# Patient Record
Sex: FEMALE | Race: OTHER | NOT HISPANIC OR LATINO | URBAN - METROPOLITAN AREA
[De-identification: names, ages, dates, MRNs, and addresses within clinical notes are randomized per-mention and may not be internally consistent; named-entity substitution may affect disease eponyms.]

---

## 2024-01-03 ENCOUNTER — EMERGENCY (EMERGENCY)
Facility: HOSPITAL | Age: 79
LOS: 1 days | Discharge: HOME | End: 2024-01-03
Attending: EMERGENCY MEDICINE | Admitting: EMERGENCY MEDICINE
Payer: MEDICARE

## 2024-01-03 VITALS
TEMPERATURE: 99 F | HEART RATE: 85 BPM | RESPIRATION RATE: 18 BRPM | OXYGEN SATURATION: 98 % | DIASTOLIC BLOOD PRESSURE: 58 MMHG | SYSTOLIC BLOOD PRESSURE: 116 MMHG

## 2024-01-03 VITALS
TEMPERATURE: 98 F | RESPIRATION RATE: 16 BRPM | HEIGHT: 64 IN | WEIGHT: 130.07 LBS | DIASTOLIC BLOOD PRESSURE: 84 MMHG | SYSTOLIC BLOOD PRESSURE: 155 MMHG | OXYGEN SATURATION: 97 % | HEART RATE: 65 BPM

## 2024-01-03 DIAGNOSIS — Z23 ENCOUNTER FOR IMMUNIZATION: ICD-10-CM

## 2024-01-03 DIAGNOSIS — S01.81XA LACERATION WITHOUT FOREIGN BODY OF OTHER PART OF HEAD, INITIAL ENCOUNTER: ICD-10-CM
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
## 2024-01-03 DIAGNOSIS — I48.91 UNSPECIFIED ATRIAL FIBRILLATION: ICD-10-CM

## 2024-01-03 DIAGNOSIS — Z87.820 PERSONAL HISTORY OF TRAUMATIC BRAIN INJURY: ICD-10-CM

## 2024-01-03 DIAGNOSIS — S09.90XA UNSPECIFIED INJURY OF HEAD, INITIAL ENCOUNTER: ICD-10-CM

## 2024-01-03 DIAGNOSIS — I45.10 UNSPECIFIED RIGHT BUNDLE-BRANCH BLOCK: ICD-10-CM
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
## 2024-01-03 DIAGNOSIS — G93.89 OTHER SPECIFIED DISORDERS OF BRAIN: ICD-10-CM

## 2024-01-03 DIAGNOSIS — Z79.01 LONG TERM (CURRENT) USE OF ANTICOAGULANTS: ICD-10-CM

## 2024-01-03 LAB
ALBUMIN SERPL ELPH-MCNC: 4.1 G/DL — SIGNIFICANT CHANGE UP (ref 3.3–5)
ALBUMIN SERPL ELPH-MCNC: 4.1 G/DL — SIGNIFICANT CHANGE UP (ref 3.3–5)
ALP SERPL-CCNC: 90 U/L — SIGNIFICANT CHANGE UP (ref 40–120)
ALP SERPL-CCNC: 90 U/L — SIGNIFICANT CHANGE UP (ref 40–120)
ALT FLD-CCNC: 14 U/L — SIGNIFICANT CHANGE UP (ref 10–45)
ALT FLD-CCNC: 14 U/L — SIGNIFICANT CHANGE UP (ref 10–45)
ANION GAP SERPL CALC-SCNC: 10 MMOL/L — SIGNIFICANT CHANGE UP (ref 5–17)
ANION GAP SERPL CALC-SCNC: 10 MMOL/L — SIGNIFICANT CHANGE UP (ref 5–17)
APTT BLD: 32 SEC — SIGNIFICANT CHANGE UP (ref 24.5–35.6)
APTT BLD: 32 SEC — SIGNIFICANT CHANGE UP (ref 24.5–35.6)
AST SERPL-CCNC: 28 U/L — SIGNIFICANT CHANGE UP (ref 10–40)
AST SERPL-CCNC: 28 U/L — SIGNIFICANT CHANGE UP (ref 10–40)
BASOPHILS # BLD AUTO: 0.01 K/UL — SIGNIFICANT CHANGE UP (ref 0–0.2)
BASOPHILS # BLD AUTO: 0.01 K/UL — SIGNIFICANT CHANGE UP (ref 0–0.2)
BASOPHILS NFR BLD AUTO: 0.2 % — SIGNIFICANT CHANGE UP (ref 0–2)
BASOPHILS NFR BLD AUTO: 0.2 % — SIGNIFICANT CHANGE UP (ref 0–2)
BILIRUB SERPL-MCNC: 0.6 MG/DL — SIGNIFICANT CHANGE UP (ref 0.2–1.2)
BILIRUB SERPL-MCNC: 0.6 MG/DL — SIGNIFICANT CHANGE UP (ref 0.2–1.2)
BLD GP AB SCN SERPL QL: NEGATIVE — SIGNIFICANT CHANGE UP
BLD GP AB SCN SERPL QL: NEGATIVE — SIGNIFICANT CHANGE UP
BUN SERPL-MCNC: 15 MG/DL — SIGNIFICANT CHANGE UP (ref 7–23)
BUN SERPL-MCNC: 15 MG/DL — SIGNIFICANT CHANGE UP (ref 7–23)
CALCIUM SERPL-MCNC: 9.6 MG/DL — SIGNIFICANT CHANGE UP (ref 8.4–10.5)
CALCIUM SERPL-MCNC: 9.6 MG/DL — SIGNIFICANT CHANGE UP (ref 8.4–10.5)
CHLORIDE SERPL-SCNC: 103 MMOL/L — SIGNIFICANT CHANGE UP (ref 96–108)
CHLORIDE SERPL-SCNC: 103 MMOL/L — SIGNIFICANT CHANGE UP (ref 96–108)
CO2 SERPL-SCNC: 25 MMOL/L — SIGNIFICANT CHANGE UP (ref 22–31)
CO2 SERPL-SCNC: 25 MMOL/L — SIGNIFICANT CHANGE UP (ref 22–31)
CREAT SERPL-MCNC: 0.89 MG/DL — SIGNIFICANT CHANGE UP (ref 0.5–1.3)
CREAT SERPL-MCNC: 0.89 MG/DL — SIGNIFICANT CHANGE UP (ref 0.5–1.3)
EGFR: 66 ML/MIN/1.73M2 — SIGNIFICANT CHANGE UP
EGFR: 66 ML/MIN/1.73M2 — SIGNIFICANT CHANGE UP
EOSINOPHIL # BLD AUTO: 0.01 K/UL — SIGNIFICANT CHANGE UP (ref 0–0.5)
EOSINOPHIL # BLD AUTO: 0.01 K/UL — SIGNIFICANT CHANGE UP (ref 0–0.5)
EOSINOPHIL NFR BLD AUTO: 0.2 % — SIGNIFICANT CHANGE UP (ref 0–6)
EOSINOPHIL NFR BLD AUTO: 0.2 % — SIGNIFICANT CHANGE UP (ref 0–6)
GLUCOSE SERPL-MCNC: 89 MG/DL — SIGNIFICANT CHANGE UP (ref 70–99)
GLUCOSE SERPL-MCNC: 89 MG/DL — SIGNIFICANT CHANGE UP (ref 70–99)
HCT VFR BLD CALC: 36.8 % — SIGNIFICANT CHANGE UP (ref 34.5–45)
HCT VFR BLD CALC: 36.8 % — SIGNIFICANT CHANGE UP (ref 34.5–45)
HGB BLD-MCNC: 12.3 G/DL — SIGNIFICANT CHANGE UP (ref 11.5–15.5)
HGB BLD-MCNC: 12.3 G/DL — SIGNIFICANT CHANGE UP (ref 11.5–15.5)
IMM GRANULOCYTES NFR BLD AUTO: 0.2 % — SIGNIFICANT CHANGE UP (ref 0–0.9)
IMM GRANULOCYTES NFR BLD AUTO: 0.2 % — SIGNIFICANT CHANGE UP (ref 0–0.9)
INR BLD: 1.16 — SIGNIFICANT CHANGE UP (ref 0.85–1.18)
INR BLD: 1.16 — SIGNIFICANT CHANGE UP (ref 0.85–1.18)
LYMPHOCYTES # BLD AUTO: 1.29 K/UL — SIGNIFICANT CHANGE UP (ref 1–3.3)
LYMPHOCYTES # BLD AUTO: 1.29 K/UL — SIGNIFICANT CHANGE UP (ref 1–3.3)
LYMPHOCYTES # BLD AUTO: 25.9 % — SIGNIFICANT CHANGE UP (ref 13–44)
LYMPHOCYTES # BLD AUTO: 25.9 % — SIGNIFICANT CHANGE UP (ref 13–44)
MCHC RBC-ENTMCNC: 31.8 PG — SIGNIFICANT CHANGE UP (ref 27–34)
MCHC RBC-ENTMCNC: 31.8 PG — SIGNIFICANT CHANGE UP (ref 27–34)
MCHC RBC-ENTMCNC: 33.4 GM/DL — SIGNIFICANT CHANGE UP (ref 32–36)
MCHC RBC-ENTMCNC: 33.4 GM/DL — SIGNIFICANT CHANGE UP (ref 32–36)
MCV RBC AUTO: 95.1 FL — SIGNIFICANT CHANGE UP (ref 80–100)
MCV RBC AUTO: 95.1 FL — SIGNIFICANT CHANGE UP (ref 80–100)
MONOCYTES # BLD AUTO: 0.58 K/UL — SIGNIFICANT CHANGE UP (ref 0–0.9)
MONOCYTES # BLD AUTO: 0.58 K/UL — SIGNIFICANT CHANGE UP (ref 0–0.9)
MONOCYTES NFR BLD AUTO: 11.6 % — SIGNIFICANT CHANGE UP (ref 2–14)
MONOCYTES NFR BLD AUTO: 11.6 % — SIGNIFICANT CHANGE UP (ref 2–14)
NEUTROPHILS # BLD AUTO: 3.09 K/UL — SIGNIFICANT CHANGE UP (ref 1.8–7.4)
NEUTROPHILS # BLD AUTO: 3.09 K/UL — SIGNIFICANT CHANGE UP (ref 1.8–7.4)
NEUTROPHILS NFR BLD AUTO: 61.9 % — SIGNIFICANT CHANGE UP (ref 43–77)
NEUTROPHILS NFR BLD AUTO: 61.9 % — SIGNIFICANT CHANGE UP (ref 43–77)
NRBC # BLD: 0 /100 WBCS — SIGNIFICANT CHANGE UP (ref 0–0)
NRBC # BLD: 0 /100 WBCS — SIGNIFICANT CHANGE UP (ref 0–0)
PLATELET # BLD AUTO: 150 K/UL — SIGNIFICANT CHANGE UP (ref 150–400)
PLATELET # BLD AUTO: 150 K/UL — SIGNIFICANT CHANGE UP (ref 150–400)
POTASSIUM SERPL-MCNC: 4.4 MMOL/L — SIGNIFICANT CHANGE UP (ref 3.5–5.3)
POTASSIUM SERPL-MCNC: 4.4 MMOL/L — SIGNIFICANT CHANGE UP (ref 3.5–5.3)
POTASSIUM SERPL-SCNC: 4.4 MMOL/L — SIGNIFICANT CHANGE UP (ref 3.5–5.3)
POTASSIUM SERPL-SCNC: 4.4 MMOL/L — SIGNIFICANT CHANGE UP (ref 3.5–5.3)
PROT SERPL-MCNC: 6.9 G/DL — SIGNIFICANT CHANGE UP (ref 6–8.3)
PROT SERPL-MCNC: 6.9 G/DL — SIGNIFICANT CHANGE UP (ref 6–8.3)
PROTHROM AB SERPL-ACNC: 13.2 SEC — HIGH (ref 9.5–13)
PROTHROM AB SERPL-ACNC: 13.2 SEC — HIGH (ref 9.5–13)
RBC # BLD: 3.87 M/UL — SIGNIFICANT CHANGE UP (ref 3.8–5.2)
RBC # BLD: 3.87 M/UL — SIGNIFICANT CHANGE UP (ref 3.8–5.2)
RBC # FLD: 13.2 % — SIGNIFICANT CHANGE UP (ref 10.3–14.5)
RBC # FLD: 13.2 % — SIGNIFICANT CHANGE UP (ref 10.3–14.5)
RH IG SCN BLD-IMP: POSITIVE — SIGNIFICANT CHANGE UP
RH IG SCN BLD-IMP: POSITIVE — SIGNIFICANT CHANGE UP
SODIUM SERPL-SCNC: 138 MMOL/L — SIGNIFICANT CHANGE UP (ref 135–145)
SODIUM SERPL-SCNC: 138 MMOL/L — SIGNIFICANT CHANGE UP (ref 135–145)
WBC # BLD: 4.99 K/UL — SIGNIFICANT CHANGE UP (ref 3.8–10.5)
WBC # BLD: 4.99 K/UL — SIGNIFICANT CHANGE UP (ref 3.8–10.5)
WBC # FLD AUTO: 4.99 K/UL — SIGNIFICANT CHANGE UP (ref 3.8–10.5)
WBC # FLD AUTO: 4.99 K/UL — SIGNIFICANT CHANGE UP (ref 3.8–10.5)

## 2024-01-03 PROCEDURE — 93005 ELECTROCARDIOGRAM TRACING: CPT

## 2024-01-03 PROCEDURE — 70450 CT HEAD/BRAIN W/O DYE: CPT | Mod: 26,MA

## 2024-01-03 PROCEDURE — 90715 TDAP VACCINE 7 YRS/> IM: CPT

## 2024-01-03 PROCEDURE — 70450 CT HEAD/BRAIN W/O DYE: CPT | Mod: MA

## 2024-01-03 PROCEDURE — 86850 RBC ANTIBODY SCREEN: CPT

## 2024-01-03 PROCEDURE — 99285 EMERGENCY DEPT VISIT HI MDM: CPT | Mod: 25

## 2024-01-03 PROCEDURE — 86901 BLOOD TYPING SEROLOGIC RH(D): CPT

## 2024-01-03 PROCEDURE — 93010 ELECTROCARDIOGRAM REPORT: CPT

## 2024-01-03 PROCEDURE — 90471 IMMUNIZATION ADMIN: CPT

## 2024-01-03 PROCEDURE — 36415 COLL VENOUS BLD VENIPUNCTURE: CPT

## 2024-01-03 PROCEDURE — 80053 COMPREHEN METABOLIC PANEL: CPT

## 2024-01-03 PROCEDURE — 12051 INTMD RPR FACE/MM 2.5 CM/<: CPT

## 2024-01-03 PROCEDURE — 85025 COMPLETE CBC W/AUTO DIFF WBC: CPT

## 2024-01-03 PROCEDURE — 99285 EMERGENCY DEPT VISIT HI MDM: CPT

## 2024-01-03 PROCEDURE — 85730 THROMBOPLASTIN TIME PARTIAL: CPT

## 2024-01-03 PROCEDURE — 86900 BLOOD TYPING SEROLOGIC ABO: CPT

## 2024-01-03 PROCEDURE — 85610 PROTHROMBIN TIME: CPT

## 2024-01-03 PROCEDURE — 70450 CT HEAD/BRAIN W/O DYE: CPT | Mod: 26,MD,77

## 2024-01-03 RX ORDER — TETANUS TOXOID, REDUCED DIPHTHERIA TOXOID AND ACELLULAR PERTUSSIS VACCINE, ADSORBED 5; 2.5; 8; 8; 2.5 [IU]/.5ML; [IU]/.5ML; UG/.5ML; UG/.5ML; UG/.5ML
0.5 SUSPENSION INTRAMUSCULAR ONCE
Refills: 0 | Status: COMPLETED | OUTPATIENT
Start: 2024-01-03 | End: 2024-01-03

## 2024-01-03 RX ORDER — ACETAMINOPHEN 500 MG
650 TABLET ORAL ONCE
Refills: 0 | Status: COMPLETED | OUTPATIENT
Start: 2024-01-03 | End: 2024-01-03

## 2024-01-03 RX ADMIN — Medication 650 MILLIGRAM(S): at 13:27

## 2024-01-03 RX ADMIN — TETANUS TOXOID, REDUCED DIPHTHERIA TOXOID AND ACELLULAR PERTUSSIS VACCINE, ADSORBED 0.5 MILLILITER(S): 5; 2.5; 8; 8; 2.5 SUSPENSION INTRAMUSCULAR at 13:28

## 2024-01-03 NOTE — ED PROVIDER NOTE - PATIENT PORTAL LINK FT
You can access the FollowMyHealth Patient Portal offered by [facility] by registering at the following website: [email or web address]. By joining [employer]’s FollowMyHealth portal, you will also be able to view your health information using other applications (apps) compatible with our system. You can access the FollowMyHealth Patient Portal offered by [facility] by registering at the following website: [email or web address]. By joining [employer]’s FollowMyHealth portal, you will also be able to view your health information using other applications (apps) compatible with our system. You can access the FollowMyHealth Patient Portal offered by [facility] by registering at the following website: [email or web address]. By joining [employer]’s FollowMyHealth portal, you will also be able to view your health information using other applications (apps) compatible with our system. You can access the FollowMyHealth Patient Portal offered by [facility] by registering at the following website: [email or web address]. By joining [employer]’s FollowMyHealth portal, you will also be able to view your health information using other applications (apps) compatible with our system.

## 2024-01-03 NOTE — CONSULT NOTE ADULT - ASSESSMENT
S/p trip and fall today, no LOC, right frontal laceration.    PMH: a fib, on Eliquis  PSH: s/p craniectomy for trauma, endovascular cardiac ablation, pacemaker.   NKDA    PE:   R frontal supraorbital 2 cm oblique irregular open wound through the muscle possibly involving frontalis and , medially undermined skin flap. No reported scalp sensory deficits.   CT possible small left fronto parietal hemorrhage.     Procedure;   Local Lidocaine 1%+1:100,00 Epi  2ml, washout, closure muscle and deep dermis - 4-0 Vicryl, Skin 6-0 Prolene.   Steri strips applied.    A/P  1. Risks including but not limited to bleeding, infection, scarring, asymmetry, sensation changes discussed.   2. Keep area clean  3. F/u 1 week  4. Observation and management per ED for possible intracranial hemorrhage.

## 2024-01-03 NOTE — ED PROVIDER NOTE - SKIN, MLM
2 cm linear vertical laceration noted to mid forehead with some bleeding, Skin otherwise normal color for race, warm, dry and intact. No evidence of rash.

## 2024-01-03 NOTE — ED ADULT NURSE NOTE - OBJECTIVE STATEMENT
Pt is a 79yo female PMHx Afib, pacemaker presents to ED c/o fall. Pt states, "I was walking in a dark hallway when I tripped over a metal bench and landed on my face". Pt noted to have a deep laceration to forehead dressed by EMS. Pt is on Eliquis, c/o mild headache where she is cut. Pt is A&Ox4, breathing equal and unlabored, denies c/p, sob, f/c.

## 2024-01-03 NOTE — ED ADULT TRIAGE NOTE - CHIEF COMPLAINT QUOTE
Pt presents to ED C/O mechanical fall over sharp metal bench at about 10AM today. C/O HA with bleeding lac to forehead. Pt neuro intact. Ambulating well independently. Pt denies dizziness/ CP prior to fall. On Eliquis.

## 2024-01-03 NOTE — ED PROVIDER NOTE - CARE PROVIDER_API CALL
Lucas Rodriguez  Plastic Surgery  [address] [place] [address]  Phone: [phone]  Fax: [phone]  Follow Up Time:    Lucas Rodriguez  Plastic Surgery  [address] [place] [address]  Phone: [phone]  Fax: [phone]  Follow Up Time:    Lucas Rodriguez  Plastic Surgery  [address] [place] [address]  Phone: [phone]  Fax: [phone]  Follow Up Time:     Andie Gonzalez  Neurosurgery  [address] [place] [address]  Phone: [phone]  Fax: [phone]  Follow Up Time:    Lucas Rodriguez  Plastic Surgery  [address] [place] [address]  Phone: [phone]  Fax: [phone]  Follow Up Time:     Andie Gonzalez  Neurosurgery  [address] [place] [address]  Phone: [phone]  Fax: [phone]  Follow Up Time:

## 2024-01-03 NOTE — ED PROVIDER NOTE - PROVIDER TOKENS
PROVIDER:[TOKEN:[[id number]]] PROVIDER:[TOKEN:[[id number]]] PROVIDER:[TOKEN:[[id number]]],PROVIDER:[TOKEN:[[id number]]] PROVIDER:[TOKEN:[[id number]]],PROVIDER:[TOKEN:[[id number]]]

## 2024-01-03 NOTE — ED PROVIDER NOTE - PROGRESS NOTE DETAILS
Ling: repeat head CT unchanged.  d/w neurosurgery, who will come down to ED with recommendations shortly.  pt and  updated. Ling: neurosurg cleared pt for DC.  may continue eliquis.  discussed with pt.  has no complaints, feels well. she will follow up with her PCP Dr Cazares at [facility] tomorrow, and recommend follow up outpt with Dr Gonzalez/neurosurgeon.

## 2024-01-03 NOTE — ED ADULT NURSE NOTE - RESPIRATION RHYTHM, QM
regular Consent (Temporal Branch)/Introductory Paragraph: The rationale for Mohs was explained to the patient and consent was obtained. The risks, benefits and alternatives to therapy were discussed in detail. Specifically, the risks of damage to the temporal branch of the facial nerve, infection, scarring, bleeding, prolonged wound healing, incomplete removal, allergy to anesthesia, and recurrence were addressed. Prior to the procedure, the treatment site was clearly identified and confirmed by the patient. All components of Universal Protocol/PAUSE Rule completed.

## 2024-01-03 NOTE — ED PROVIDER NOTE - CLINICAL SUMMARY MEDICAL DECISION MAKING FREE TEXT BOX
78-year-old female with past medical history of atrial fibrillation [on Eliquis only], denies any other medical history or medications presents today with mechanical trip and fall with laceration to mid forehead that occurred approximately 2 hours prior to ED arrival.  Patient went to an urgent care initially and was advised that she would need a CT head and sutures.  Patient presents to [facility] ED for CT head and is requesting plastic surgery.  Does not recall her last tetanus vaccination.  Is complaining of a mild headache in the area of her laceration.  Denies neck pain, back pain, chest pain, shortness of breath, abdominal pain, joint pain or any other extremity pain. No other complaints.    ED course: Patient afebrile.  Rest of vital signs are within normal limits. On exam, 2 cm laceration noted to mid-forehead. Given Tylenol. Tetanus updated. Plastics consulted and Dr. Rodriguez in ED to suture patient.  Patient to follow-up with him in 1 week. Neurosurgery consulted and would like a repeat CT scan in 4 hours which is scheduled for 1700 hrs.  Patient is advised of this plan.  dispo per CT scan. 78-year-old female with past medical history of atrial fibrillation [on Eliquis only], denies any other medical history or medications presents today with mechanical trip and fall with laceration to mid forehead that occurred approximately 2 hours prior to ED arrival.  Patient went to an urgent care initially and was advised that she would need a CT head and sutures.  Patient presents to [facility] ED for CT head and is requesting plastic surgery.  Does not recall her last tetanus vaccination.  Is complaining of a mild headache in the area of her laceration.  Denies neck pain, back pain, chest pain, shortness of breath, abdominal pain, joint pain or any other extremity pain. No other complaints.    ED course: Patient afebrile.  Rest of vital signs are within normal limits. On exam, 2 cm laceration noted to mid-forehead. Given Tylenol. Tetanus updated. Plastics consulted and Dr. Rodriguez in ED to suture patient.  Patient to follow-up with him in 1 week. Neurosurgery consulted and would like a repeat CT scan in 4 hours which is scheduled for 1700 hrs.  Patient is advised of this plan.  dispo per CT scan. 78-year-old female with past medical history of atrial fibrillation [on Eliquis only], denies any other medical history or medications presents today with mechanical trip and fall with laceration to mid forehead that occurred approximately 2 hours prior to ED arrival.  Patient went to an urgent care initially and was advised that she would need a CT head and sutures.  Patient presents to [facility] ED for CT head and is requesting plastic surgery.  Does not recall her last tetanus vaccination.  Is complaining of a mild headache in the area of her laceration.  Denies neck pain, back pain, chest pain, shortness of breath, abdominal pain, joint pain or any other extremity pain. No other complaints.    ED course:   Patient afebrile.  Rest of vital signs are within normal limits. On exam, pt with 2 cm laceration noted to mid-forehead. Given Tylenol. Tetanus updated. CT done and shows: IMPRESSION:  1. Tiny low density extra-axial fluid collection overlying the left   frontal parietal convexity containing a curvilinear focus of soft tissue   with calcifications that may represent postoperative material, septation   or dural thickening. However there are foci of hyperdensity along the   parietal frontal parietal   and temporal convexity that may represent   calcification or hemorrhage.  2. No calvarial fracture. Frontoparietotemporal craniectomy and   cranioplasty with encephalomalacic changes seen in the left frontal lobe.    Patient was questioned about post-op findings and then admits to having ICH 2+ years ago which required surgery. Neurosurgery consulted and would like a repeat CT scan in 4 hours which is scheduled for 1700 hrs. Plastics consulted and Dr. Rodriguez in ED to suture patient.  Patient to follow-up with him in 1 week. Patient is advised of this plan. Case endorsed to night team. Dispo per repeat CT scan and reevaluation. 78-year-old female with past medical history of atrial fibrillation [on Eliquis only], denies any other medical history or medications presents today with mechanical trip and fall with laceration to mid forehead that occurred approximately 2 hours prior to ED arrival.  Patient went to an urgent care initially and was advised that she would need a CT head and sutures.  Patient presents to [facility] ED for CT head and is requesting plastic surgery.  Does not recall her last tetanus vaccination.  Is complaining of a mild headache in the area of her laceration.  Denies neck pain, back pain, chest pain, shortness of breath, abdominal pain, joint pain or any other extremity pain. No other complaints.    ED course:   Patient afebrile.  Rest of vital signs are within normal limits. On exam, pt with 2 cm laceration noted to mid-forehead. Given Tylenol. Tetanus updated. CT done and shows: IMPRESSION:  1. Tiny low density extra-axial fluid collection overlying the left   frontal parietal convexity containing a curvilinear focus of soft tissue   with calcifications that may represent postoperative material, septation   or dural thickening. However there are foci of hyperdensity along the   parietal frontal parietal   and temporal convexity that may represent   calcification or hemorrhage.  2. No calvarial fracture. Frontoparietotemporal craniectomy and   cranioplasty with encephalomalacic changes seen in the left frontal lobe.    Patient was questioned about post-op findings and then admits to having ICH 2+ years ago which required surgery. Neurosurgery consulted and would like a repeat CT scan in 4 hours which is scheduled for 1700 hrs. Plastics consulted and Dr. Rodriguez in ED to suture patient.  Patient to follow-up with him in 1 week. Patient is advised of this plan. Case endorsed to night team. Dispo per repeat CT scan and reevaluation. 78-year-old female with past medical history of atrial fibrillation [on Eliquis only], denies any other medical history or medications presents today with mechanical trip and fall with laceration to mid forehead that occurred approximately 2 hours prior to ED arrival.  Patient went to an urgent care initially and was advised that she would need a CT head and sutures.  Patient presents to [facility] ED for CT head and is requesting plastic surgery.  Does not recall her last tetanus vaccination.  Is complaining of a mild headache in the area of her laceration.  Denies neck pain, back pain, chest pain, shortness of breath, abdominal pain, joint pain or any other extremity pain. No other complaints.    ED course:   Patient afebrile.  Rest of vital signs are within normal limits. On exam, pt with 2 cm laceration noted to mid-forehead. Given Tylenol. Tetanus updated. CT done and shows: IMPRESSION:  1. Tiny low density extra-axial fluid collection overlying the left   frontal parietal convexity containing a curvilinear focus of soft tissue   with calcifications that may represent postoperative material, septation   or dural thickening. However there are foci of hyperdensity along the   parietal frontal parietal   and temporal convexity that may represent   calcification or hemorrhage.  2. No calvarial fracture. Frontoparietotemporal craniectomy and   cranioplasty with encephalomalacic changes seen in the left frontal lobe.    Patient was questioned about post-op findings and then admits to having ICH 2+ years ago which required surgery. Neurosurgery consulted and would like a repeat CT scan in 4 hours which is scheduled for 1700 hrs. Plastics consulted and Dr. Rodriguez in ED to suture patient.  Patient to follow-up with him in 1 week. Patient is advised of the plan. Case endorsed to night team. Dispo per repeat CT scan and reevaluation. 78-year-old female with past medical history of atrial fibrillation [on Eliquis only], denies any other medical history or medications presents today with mechanical trip and fall with laceration to mid forehead that occurred approximately 2 hours prior to ED arrival.  Patient went to an urgent care initially and was advised that she would need a CT head and sutures.  Patient presents to [facility] ED for CT head and is requesting plastic surgery.  Does not recall her last tetanus vaccination.  Is complaining of a mild headache in the area of her laceration.  Denies neck pain, back pain, chest pain, shortness of breath, abdominal pain, joint pain or any other extremity pain. No other complaints.    ED course:   Patient afebrile.  Rest of vital signs are within normal limits. On exam, pt with 2 cm laceration noted to mid-forehead. Given Tylenol. Tetanus updated. CT done and shows: IMPRESSION:  1. Tiny low density extra-axial fluid collection overlying the left   frontal parietal convexity containing a curvilinear focus of soft tissue   with calcifications that may represent postoperative material, septation   or dural thickening. However there are foci of hyperdensity along the   parietal frontal parietal   and temporal convexity that may represent   calcification or hemorrhage.  2. No calvarial fracture. Frontoparietotemporal craniectomy and   cranioplasty with encephalomalacic changes seen in the left frontal lobe.    Patient was questioned about post-op findings and then admits to having ICH 2+ years ago which required surgery. Neurosurgery consulted and would like a repeat CT scan in 4 hours which is scheduled for 1700 hrs. Plastics consulted and Dr. Rodriguez in ED to suture patient.  Patient to follow-up with him in 1 week. Patient is advised of the plan. Case endorsed to night team. Dispo per repeat CT scan and reevaluation.

## 2024-01-03 NOTE — ED PROVIDER NOTE - NSFOLLOWUPINSTRUCTIONS_ED_ALL_ED_FT
Please follow up with :  Lucas Butterfield  Plastic Surgery  [address] [place] [address], [address]  [place] [address]  Phone: [phone]  Fax: [phone]    Please go to his office from the ER. Your CT head with negative for a brain bleed or a fracture. Please follow up with :  Lucas Butterfield  Plastic Surgery  [address] [place] [address], [address]  [place] [address]  Phone: [phone]  Fax: [phone]    Please go to his office from the ER. Your CT head with negative for a brain bleed or a fracture. Please follow up with :  Lucas Butterfield (Plastic Surgery) in one week.   [address] [place] [address], [address]  [place] [address]  Phone: [phone]  Fax: [phone]    Please see your PCP in 3-5 days. Return to the ER if you develop any concerning symptoms.     Closed Head Injury    A closed head injury is an injury to your head that may or may not involve a traumatic brain injury (TBI). Symptoms of TBI can be short or long lasting and include headache, dizziness, interference with memory or speech, fatigue, confusion, changes in sleep, mood changes, nausea, depression/anxiety, and dulling of senses. Make sure to obtain proper rest which includes getting plenty of sleep, avoiding excessive visual stimulation, and avoiding activities that may cause physical or mental stress. Avoid any situation where there is potential for another head injury, including sports.    SEEK IMMEDIATE MEDICAL CARE IF YOU HAVE ANY OF THE FOLLOWING SYMPTOMS: unusual drowsiness, vomiting, severe dizziness, seizures, lightheadedness, muscular weakness, different pupil sizes, visual changes, or clear or bloody discharge from your ears or nose.    Laceration    A laceration is a cut that goes through all of the layers of the skin and into the tissue that is right under the skin. Some lacerations heal on their own. Others need to be closed with skin adhesive strips, skin glue, stitches (sutures), or staples. Proper laceration care minimizes the risk of infection and helps the laceration to heal better.  If non-absorbable stitches or staples have been placed, they must be taken out within the time frame instructed by your healthcare provider.    SEEK IMMEDIATE MEDICAL CARE IF YOU HAVE ANY OF THE FOLLOWING SYMPTOMS: swelling around the wound, worsening pain, drainage from the wound, red streaking going away from your wound, inability to move finger or toe near the laceration, or discoloration of skin near the laceration. Please follow up with :  Lucas Butterfield (Plastic Surgery) in one week.   [address] [place] [address], [address]  [place] [address]  Phone: [phone]  Fax: [phone]    Please see your PCP in 3-5 days. Return to the ER if you develop any concerning symptoms.     Closed Head Injury    A closed head injury is an injury to your head that may or may not involve a traumatic brain injury (TBI). Symptoms of TBI can be short or long lasting and include headache, dizziness, interference with memory or speech, fatigue, confusion, changes in sleep, mood changes, nausea, depression/anxiety, and dulling of senses. Make sure to obtain proper rest which includes getting plenty of sleep, avoiding excessive visual stimulation, and avoiding activities that may cause physical or mental stress. Avoid any situation where there is potential for another head injury, including sports.    SEEK IMMEDIATE MEDICAL CARE IF YOU HAVE ANY OF THE FOLLOWING SYMPTOMS: unusual drowsiness, vomiting, severe dizziness, seizures, lightheadedness, muscular weakness, different pupil sizes, visual changes, or clear or bloody discharge from your ears or nose.    Laceration    A laceration is a cut that goes through all of the layers of the skin and into the tissue that is right under the skin. Some lacerations heal on their own. Others need to be closed with skin adhesive strips, skin glue, stitches (sutures), or staples. Proper laceration care minimizes the risk of infection and helps the laceration to heal better.  If non-absorbable stitches or staples have been placed, they must be taken out within the time frame instructed by your healthcare provider.    SEEK IMMEDIATE MEDICAL CARE IF YOU HAVE ANY OF THE FOLLOWING SYMPTOMS: swelling around the wound, worsening pain, drainage from the wound, red streaking going away from your wound, inability to move finger or toe near the laceration, or discoloration of skin near the laceration. You may resume Eliquis.    Tylenol if needed for pain, use as directed.    Please see your PCP in 3-5 days - call tomorrow. Return to the ER if you develop any concerning symptoms.     Follow up with neurosurgery - call to schedule.     Please follow up with :  Lucas Butterfield (Plastic Surgery) in one week.   [address] [place] [address], [address]  [place] [address]  Phone: [phone]  Fax: [phone]    Closed Head Injury    A closed head injury is an injury to your head that may or may not involve a traumatic brain injury (TBI). Symptoms of TBI can be short or long lasting and include headache, dizziness, interference with memory or speech, fatigue, confusion, changes in sleep, mood changes, nausea, depression/anxiety, and dulling of senses. Make sure to obtain proper rest which includes getting plenty of sleep, avoiding excessive visual stimulation, and avoiding activities that may cause physical or mental stress. Avoid any situation where there is potential for another head injury, including sports.    SEEK IMMEDIATE MEDICAL CARE IF YOU HAVE ANY OF THE FOLLOWING SYMPTOMS: unusual drowsiness, vomiting, severe dizziness, seizures, lightheadedness, muscular weakness, different pupil sizes, visual changes, or clear or bloody discharge from your ears or nose.    Laceration    A laceration is a cut that goes through all of the layers of the skin and into the tissue that is right under the skin. Some lacerations heal on their own. Others need to be closed with skin adhesive strips, skin glue, stitches (sutures), or staples. Proper laceration care minimizes the risk of infection and helps the laceration to heal better.  If non-absorbable stitches or staples have been placed, they must be taken out within the time frame instructed by your healthcare provider.    SEEK IMMEDIATE MEDICAL CARE IF YOU HAVE ANY OF THE FOLLOWING SYMPTOMS: swelling around the wound, worsening pain, drainage from the wound, red streaking going away from your wound, inability to move finger or toe near the laceration, or discoloration of skin near the laceration. You may resume Eliquis.    Tylenol if needed for pain, use as directed.    Please see your PCP in 3-5 days - call tomorrow. Return to the ER if you develop any concerning symptoms.     Follow up with neurosurgery - call to schedule.     Please follow up with :  Lucas Butterfield (Plastic Surgery) in one week.   [address] [place] [address], [address]  [place] [address]  Phone: [phone]  Fax: [phone]    Closed Head Injury    A closed head injury is an injury to your head that may or may not involve a traumatic brain injury (TBI). Symptoms of TBI can be short or long lasting and include headache, dizziness, interference with memory or speech, fatigue, confusion, changes in sleep, mood changes, nausea, depression/anxiety, and dulling of senses. Make sure to obtain proper rest which includes getting plenty of sleep, avoiding excessive visual stimulation, and avoiding activities that may cause physical or mental stress. Avoid any situation where there is potential for another head injury, including sports.    SEEK IMMEDIATE MEDICAL CARE IF YOU HAVE ANY OF THE FOLLOWING SYMPTOMS: unusual drowsiness, vomiting, severe dizziness, seizures, lightheadedness, muscular weakness, different pupil sizes, visual changes, or clear or bloody discharge from your ears or nose.    Laceration    A laceration is a cut that goes through all of the layers of the skin and into the tissue that is right under the skin. Some lacerations heal on their own. Others need to be closed with skin adhesive strips, skin glue, stitches (sutures), or staples. Proper laceration care minimizes the risk of infection and helps the laceration to heal better.  If non-absorbable stitches or staples have been placed, they must be taken out within the time frame instructed by your healthcare provider.    SEEK IMMEDIATE MEDICAL CARE IF YOU HAVE ANY OF THE FOLLOWING SYMPTOMS: swelling around the wound, worsening pain, drainage from the wound, red streaking going away from your wound, inability to move finger or toe near the laceration, or discoloration of skin near the laceration.

## 2024-01-03 NOTE — ED PROVIDER NOTE - CONSTITUTIONAL, MLM
[Father] : father [Patient] : patient normal... Well appearing, awake, alert, oriented  and in no apparent distress.

## 2024-01-03 NOTE — ED PROVIDER NOTE - OBJECTIVE STATEMENT
78-year-old female with past medical history of atrial fibrillation [on Eliquis only], denies any other medical history or medications presents today with mechanical trip and fall with laceration to mid forehead that occurred approximately 2 hours prior to ED arrival.  Patient went to an urgent care initially and was advised that she would need a CT head and sutures.  Patient presents to [facility] ED for CT head and is requesting plastic surgery.  Does not recall her last tetanus vaccination.  Is complaining of a mild headache in the area of her laceration.  Denies neck pain, back pain, chest pain, shortness of breath, abdominal pain, joint pain or any other extremity pain.  No other complaints. 78-year-old female with past medical history of atrial fibrillation [on Eliquis only], denies any other medical history or medications presents today with mechanical trip and fall with laceration to mid forehead that occurred approximately 2 hours prior to ED arrival.  Patient went to an urgent care initially and was advised that she would need a CT head and sutures.  Patient presents to [facility] ED for CT head and is requesting plastic surgery.  Does not recall her last tetanus vaccination.  Is complaining of a mild headache in the area of her laceration.  Denies neck pain, back pain, chest pain, shortness of breath, abdominal pain, joint pain or any other extremity pain.  No other complaints. 78-year-old female with past medical history of atrial fibrillation [on Eliquis only], denies any other medical history or medications, presents today with mechanical trip and fall with laceration to mid forehead that occurred approximately 2 hours prior to ED arrival.  Patient went to an urgent care initially and was advised that she would need a CT head and sutures for her laceration.  Patient presents to [facility] ED for CT head and is requesting plastic surgery.  Does not recall her last tetanus vaccination.  Is complaining of a mild headache in the area of her laceration.  Denies neck pain, back pain, chest pain, shortness of breath, abdominal pain, joint pain or any other extremity pain. No other complaints. 78-year-old female with past medical history of atrial fibrillation [on Eliquis only], denies any other medical history or medications, presents today with mechanical trip and fall with laceration to mid forehead that occurred approximately 2 hours prior to ED arrival.  Patient went to an urgent care initially and was advised that she would need a CT head and sutures for her laceration.  Patient presents to [facility] ED for CT head and is requesting plastic surgery.  Does not recall her last tetanus vaccination.  Is complaining of a mild headache in the area of her laceration.  Denies neck pain, back pain, chest pain, shortness of breath, abdominal pain, joint pain or any other extremity pain. No other complaints.

## 2024-01-03 NOTE — ED ADULT NURSE REASSESSMENT NOTE - NS ED NURSE REASSESS COMMENT FT1
Received report from day shift RN. Pt resting comfortably in stretcher. Resp even and unlabored. Awaiting neurology consult.

## 2024-01-03 NOTE — CONSULT NOTE ADULT - SUBJECTIVE AND OBJECTIVE BOX
77 yo female sent to ED for a CT head. Patient had a mechanical fall today. No LOC prior or after the fall. Patient  was able to get up apply pressure to a forehead laceration.   She went to an urgent care and from there she was sent  here for CT head. Here, patient is neurologically  intact. She denies headache nausea, SOB, motor weakness, admits to pain to frontal laceration.  She take Eliquis for Afib, 5mg BID and she took 1 dose this  morning.  She is s/p decompressive craniectomy for traumatic brain hemorrhage in 2020 at [place] followed by cranioplasty with synthetic bone.   PMH: A. Fib  Medication: Eliquis 5mg bid.  Exam: Gen: Well developed, well groomed female in no apparent distress.  Neuro: A&O x 3, PERRL, EOMI. CN II to XII are grossly intact.  Ext: No focal motor deficit,5/5 x 4  No sensory deficit to touch.  CT head: FINDINGS:  VENTRICLES AND SULCI: There is exvacuo dilatation of the left frontal   horn. Otherwise, the ventricles, cisternal spaces and cortical sulci are   normal in size and configuration for the patient's stated age.  INTRA-AXIAL: There is chronic left frontal lobe encephalomalacia and   gliosis  . No intracranial mass effect, acute hemorrhage, or midline   shift is present. No acute transcortical infarction.  EXTRA-AXIAL: There is a thickened curvilinear iso to hyper density   overlying the left frontal parietal and temporal convexitywith foci   coarse calcifications of that may represent postsurgical changes,   septation and/or thickened dura. There are scattered of extra-axial   curvilinear areas of hyperdensity particularly along the high parietal   and overlying the left temporal convexity that may represent acute   hemorrhage.  VISUALIZED SINUSES: No air-fluid levels are identified.  VISUALIZED MASTOIDS: Clear.  CALVARIUM: Frontoparietotemporal craniectomy and cranioplasty with right   with with preformed ..    IMPRESSION:    1. Tiny low density extra-axial fluid collection overlying the left   frontal parietal convexity containing a curvilinear focus of soft tissue   with calcifications that may represent postoperative material, septation   or dural thickening. However there are foci of hyperdensity along the   parietal frontal parietal   and temporal convexity that may represent   calcification or hemorrhage.  2. No calvarial fracture. Frontoparietotemporal craniectomy and   cranioplasty with encephalomalacic changes seen in the left frontal lobe.    Assessment: 78 female suffered a mechanical fall today without any neurological deficit. CT scan shows tiny hyperdensities around cranioplasty site that may be romana hemorrhage vs calcification vs product of surgery.    Plan: Repeat CT head 4 to 6 hrs. If stable may follow with Dr. Chris Andrade as out patient.  - No need ro reverse Eliquis   Case discussed with Dr. Chris Andrade.   79 yo female sent to ED for a CT head. Patient had a mechanical fall today. No LOC prior or after the fall. Patient  was able to get up apply pressure to a forehead laceration.   She went to an urgent care and from there she was sent  here for CT head. Here, patient is neurologically  intact. She denies headache nausea, SOB, motor weakness, admits to pain to frontal laceration.  She take Eliquis for Afib, 5mg BID and she took 1 dose this  morning.  She is s/p decompressive craniectomy for traumatic brain hemorrhage in 2020 at [place] followed by cranioplasty with synthetic bone.   PMH: A. Fib  Medication: Eliquis 5mg bid.  Exam: Gen: Well developed, well groomed female in no apparent distress.  Neuro: A&O x 3, PERRL, EOMI. CN II to XII are grossly intact.  Ext: No focal motor deficit,5/5 x 4  No sensory deficit to touch.  CT head: FINDINGS:  VENTRICLES AND SULCI: There is exvacuo dilatation of the left frontal   horn. Otherwise, the ventricles, cisternal spaces and cortical sulci are   normal in size and configuration for the patient's stated age.  INTRA-AXIAL: There is chronic left frontal lobe encephalomalacia and   gliosis  . No intracranial mass effect, acute hemorrhage, or midline   shift is present. No acute transcortical infarction.  EXTRA-AXIAL: There is a thickened curvilinear iso to hyper density   overlying the left frontal parietal and temporal convexitywith foci   coarse calcifications of that may represent postsurgical changes,   septation and/or thickened dura. There are scattered of extra-axial   curvilinear areas of hyperdensity particularly along the high parietal   and overlying the left temporal convexity that may represent acute   hemorrhage.  VISUALIZED SINUSES: No air-fluid levels are identified.  VISUALIZED MASTOIDS: Clear.  CALVARIUM: Frontoparietotemporal craniectomy and cranioplasty with right   with with preformed ..    IMPRESSION:    1. Tiny low density extra-axial fluid collection overlying the left   frontal parietal convexity containing a curvilinear focus of soft tissue   with calcifications that may represent postoperative material, septation   or dural thickening. However there are foci of hyperdensity along the   parietal frontal parietal   and temporal convexity that may represent   calcification or hemorrhage.  2. No calvarial fracture. Frontoparietotemporal craniectomy and   cranioplasty with encephalomalacic changes seen in the left frontal lobe.    Assessment: 78 female suffered a mechanical fall today without any neurological deficit. CT scan shows tiny hyperdensities around cranioplasty site that may be romana hemorrhage vs calcification vs product of surgery.    Plan: Repeat CT head 4 to 6 hrs. If stable may follow with Dr. Chris Andrade as out patient.  - No need ro reverse Eliquis   Case discussed with Dr. Chris Andrade.

## 2024-01-03 NOTE — ED ADULT NURSE NOTE - NSFALLHARMRISKINTERV_ED_ALL_ED
Communicate risk of Fall with Harm to all staff, patient, and family/Provide visual cue: red socks, yellow wristband, yellow gown, etc/Reinforce activity limits and safety measures with patient and family/Bed in lowest position, wheels locked, appropriate side rails in place/Call bell, personal items and telephone in reach/Instruct patient to call for assistance before getting out of bed/chair/stretcher/Non-slip footwear applied when patient is off stretcher/[place] to call system/Physically safe environment - no spills, clutter or unnecessary equipment/Purposeful Proactive Rounding/Room/bathroom lighting operational, light cord in reach Communicate risk of Fall with Harm to all staff, patient, and family/Provide visual cue: red socks, yellow wristband, yellow gown, etc/Reinforce activity limits and safety measures with patient and family/Bed in lowest position, wheels locked, appropriate side rails in place/Call bell, personal items and telephone in reach/Instruct patient to call for assistance before getting out of bed/chair/stretcher/Non-slip footwear applied when patient is off stretcher/[place] to call system/Physically safe environment - no spills, clutter or unnecessary equipment/Purposeful Proactive Rounding/Room/bathroom lighting operational, light cord in reach